# Patient Record
Sex: FEMALE | Race: OTHER
[De-identification: names, ages, dates, MRNs, and addresses within clinical notes are randomized per-mention and may not be internally consistent; named-entity substitution may affect disease eponyms.]

---

## 2020-08-10 ENCOUNTER — APPOINTMENT (OUTPATIENT)
Dept: GASTROENTEROLOGY | Facility: CLINIC | Age: 55
End: 2020-08-10
Payer: COMMERCIAL

## 2020-08-10 VITALS
HEIGHT: 64 IN | SYSTOLIC BLOOD PRESSURE: 122 MMHG | TEMPERATURE: 98.6 F | BODY MASS INDEX: 27.31 KG/M2 | DIASTOLIC BLOOD PRESSURE: 80 MMHG | WEIGHT: 160 LBS | RESPIRATION RATE: 15 BRPM | HEART RATE: 88 BPM | OXYGEN SATURATION: 96 %

## 2020-08-10 DIAGNOSIS — R93.89 ABNORMAL FINDINGS ON DIAGNOSTIC IMAGING OF OTHER SPECIFIED BODY STRUCTURES: ICD-10-CM

## 2020-08-10 PROCEDURE — 99204 OFFICE O/P NEW MOD 45 MIN: CPT

## 2020-08-10 NOTE — PHYSICAL EXAM
[General Appearance - Alert] : alert [General Appearance - Well Nourished] : well nourished [General Appearance - In No Acute Distress] : in no acute distress [General Appearance - Well Developed] : well developed [Sclera] : the sclera and conjunctiva were normal [General Appearance - Well-Appearing] : healthy appearing [PERRL With Normal Accommodation] : pupils were equal in size, round, and reactive to light [Extraocular Movements] : extraocular movements were intact [Outer Ear] : the ears and nose were normal in appearance [Examination Of The Oral Cavity] : the lips and gums were normal [Both Tympanic Membranes Were Examined] : both tympanic membranes were normal [Neck Appearance] : the appearance of the neck was normal [Neck Cervical Mass (___cm)] : no neck mass was observed [Respiration, Rhythm And Depth] : normal respiratory rhythm and effort [Exaggerated Use Of Accessory Muscles For Inspiration] : no accessory muscle use [Heart Rate And Rhythm] : heart rate was normal and rhythm regular [Murmurs] : no murmurs [Heart Sounds] : normal S1 and S2 [Edema] : there was no peripheral edema [Abdomen Soft] : soft [Bowel Sounds] : normal bowel sounds [Abdomen Tenderness] : non-tender [Abdomen Mass (___ Cm)] : no abdominal mass palpated [No CVA Tenderness] : no ~M costovertebral angle tenderness [Abnormal Walk] : normal gait [Nail Clubbing] : no clubbing  or cyanosis of the fingernails [Musculoskeletal - Swelling] : no joint swelling seen [Skin Color & Pigmentation] : normal skin color and pigmentation [Skin Turgor] : normal skin turgor [] : no rash [No Focal Deficits] : no focal deficits [Oriented To Time, Place, And Person] : oriented to person, place, and time [Impaired Insight] : insight and judgment were intact [Affect] : the affect was normal [Memory Recent] : recent memory was not impaired [Mood] : the mood was normal

## 2020-08-10 NOTE — HISTORY OF PRESENT ILLNESS
[Nausea] : denies nausea [Heartburn] : stable heartburn [Vomiting] : denies vomiting [Constipation] : improvement in constipation [Diarrhea] : stable diarrhea [Yellow Skin Or Eyes (Jaundice)] : denies jaundice [Abdominal Swelling] : denies abdominal swelling [Abdominal Pain] : denies abdominal pain [Rectal Pain] : denies rectal pain [GERD] : gastroesophageal reflux disease [Peptic Ulcer Disease] : peptic ulcer disease [Cholelithiasis] : cholelithiasis [Diverticulitis] : diverticulitis [Cholecystectomy] : cholecystectomy [Appendectomy] : appendectomy [Abdominal Surgery] : abdominal surgery [Hiatus Hernia] : no hiatus hernia [Pancreatitis] : no pancreatitis [Kidney Stone] : no kidney stone [Inflammatory Bowel Disease] : no inflammatory bowel disease [Irritable Bowel Syndrome] : no irritable bowel syndrome [Alcohol Abuse] : no alcohol abuse [Malignancy] : no malignancy [de-identified] : 55 yo F PMH back pain, appendectomy, cholecystectomy, HLD who presents for evaluation of abnormal LFTs and abnormal liver ultrasound. Additionally patient with episodes of diverticulitis and new onset of rectal bleeding. Patient also reports having heartburn and has a history of H pylori s/p treatment, and abnormal EGD. \par \par Patient reports she was referred here today for an abnormal liver ultrasound. Review of the ultrasound shows increased echotecture diffusely, possibly from steatosis or chronic parenchymal disease. Also noted increased diameter of CBD. She notes that ultrasound was done because of abnormal LFTs with AST 51/ALT 77 (normal AP, bilis). Since then she was started on a statin for elevated cholesterol and had a repeat set of labs that revealed AST 23/ALT 30. Given her family history of mom with metastatic cancer (unclear source was stomach, colon, cholangio) PMD referred to GI for further evaluation. She is also very nervous because her sister was diagnosed with breast cancer that is now metastatic to bone. Patient denies any jaundice, scleral icterus, confusion, kate colored stools (sometimes yellow). \par \par Complains of heartburn 2-3x/week and takes OTC pepcid etc for these symptoms with improvement. She does note that she has a histroy of H pylori that was treated with 2-3 weeks of antibiotic course. She did have an H pylori breath test in march 2016 that was negative for H pylori. Did have an EGD with Dr. Mane in 2016 to r/o gastric outlet obstruction, but per patient there was an abnormal area that he biopsied given her family history and history of H pylori and per patient results were unremarkable. No EGD report available for review today. \par \par She also complains of issues with diverticulitis. Since she started having issues with diverticulitis 4-5 yrs ago she notes she has had 3 episodes. They do not get severe enough where she has to seek care at the hospital. She denies fevers. She reports the most recent episode was 2 weeks ago. She took a laxative, went to the bathroom, and reports that though painful at the time, it resolved in 2-3 days. She now feels pain free. Pain is always localized to llq\par \par She previously had more issues with constipation which seem to be improved overall now that she is drinking more water, taking probiotics. If she does not take these will be constipated overall. \par \par She notes some intermittent episodes of blood in the stool. Has happened 4x this year. She does not think it correlates with straining or being constipated. She thinks this is a new symptom. She did not have it when she had her last colonoscopy. No mention of hemorrhoids on the prior colonoscopy. She notes that blood seems to be mixed in with the stool and not really when she wipes or just in the toilet. \par \par She also has been having some diarrhea, which is relatively new for her as well. She thinks this is correlated to stress. Constipation has been improved overall, but 2x/week will have watery diarrhea. Does not think she has these episodes after being severely constipated. \par \par Prior procedures\par Colonoscopy: entire examined colon is normal, mild diverticulosis in the sigmoid colon. No e/o bleeding\par \par Prior imaging\par Ulatround 7/25/20: Although CBD is within normal limits for the postcholecystectomy state at 9mm, it is larger in caliber than prior exam when it measured 4mm and the patient was status post cholecystectomy at that time too. Although no choledocholithiasis is seen, the mid to distal CBD was obscured by bowel gas. Further evaluation with MRCP or ERCP is advised. \par \par The liver is diffusely increased in echotexture, either on the basis of hepatic steatosis or chronic parenchymal disease though new from prior\par \par PMH\par HLD\par \par \par PSH\par Appendectomy\par Cholecystectomy\par Ankle surgery\par \par MEDS: \par hydrocodone-acetaminophen 5-325, taken PRN severe back pain\par statin\par vitamin D supplementation\par \par SH: \par stopped EtOH 3 weeks ago (previously drinking 1 beer/vodka and orange juice daily). Reports it was never excessive (but we discussed that for women 7 drinks per week is the maxiumum recommended amount, furthermore given abnormal LFTs, would recommend cessation) \par Prior smoker, quit a long time ago\par Denies illicit drug use\par \par FH: \par Mother with metastatic gastric vs cholangiocarcinoma. Unclear if also could have been CRC\par Sister recently diagnosed/currently fighting breast cancer metastatic to bone \par Father with stomach issues: reports her dad had over 20 surgeries\par

## 2020-08-10 NOTE — ASSESSMENT
[FreeTextEntry1] : 53 yo F PMH back pain, appendectomy, cholecystectomy, HLD who presents after referred for for evaluation of abnormal LFTs and abnormal liver ultrasound. Additionally patient with episodes of diverticulitis and new onset of rectal bleeding. Patient also reports having heartburn and has a history of H pylori s/p treatment, and abnormal EGD. \par \par Abnormal ultrasound of liver, abnormal LFTs (mild transamintis which has improved since starting a statin): liver with increased echogenicity on ultrasound consistent with likely hepatic steatosis as well as increased diameter of CBD from 4mm to 9mm (previously underwent ultrasound when CBD was 4mm, which was post-cholecystectomy. Now diameter has increased to 9mm). This could be normal with aging, but is a bit more than would be expected. \par - given possible family history of cholangio, will plan for MRI liver and MRCP to evaluate the abnormal echogenicity as well as the enlarged CBD\par - repeat LFTs planned for end August (however AST 50s/ALT 70s) now improved to normal AST/ALT 30 after starting statin\par - continue statin\par - discussed continued EtOH cessation\par - discussed importance of weight loss to prevent steatosis (patient has been eating less pasta, ice cream, breads)\par \par #Heartburn, abnormal EGD per patient, history of H pylori: unclear if patient with possible PUD, gastropathy, gastritis, esophagitis\par - given family history of mom with metastatic gastric vs gallbladder ca, plan for EGD to evaluate ongoing/worsening heartburn as well as history of abnormal finding on EGD. Patient is also asking to have "check up done" because she is nervous (given mom's history and sister recently diagnosed with breast cancer metastatic to bone)\par - will obtain report of prior EGD\par - patient had labs 2-3 wks ago, will be getting labs with PMD end of August (asked her to check and make sure getting BMP, CBC and repeat LFTs) at that lab draw. Will hold off doing pre-procedure labs in the office today, given she will be getting labs soon. Patient expresses understanding that labs are needed within 30 days of the procedure\par \par Rectal bleeding - has had 4 episodes of rectal bleeding in the past year. She reports this is a new symptoms (and was not present when she underwent the colonoscopy in 2016). Has continued to have intermittent episodes of diverticulitis with pain in the LLQ\par - plan for colonoscopy (given family history of mother's metastatic cancer - thought to be gastric or gallbladder in origin, but could have been colon?) and new symptom of rectal bleeding, will repeat colonoscopy\par \par Diverticulitis \par - discussed the alarm symptoms that patient should seek medical care. Generally symptoms self resolve in 2-3 days. patient has not had fevers, severe unrelenting pain. \par - may need to consider empiric abx prescription if continues to have episodes (to be taken during episodes)\par - prior colonoscopy with diverticulosis noted\par \par Constipation: is on narcotics PRN for severe back pain. We discussed this can make constipation worse.\par - continue water and probiotics, which helps keep constipation controlled\par - if any worsening of symptoms, can consider laxatives (i.e. miralax) and/or fiber (benefiber) supplement\par \par Diarrhea - episodes twice a week. Suspect component of overflow incontinence, however patient reports constipation has improved and has been having regular BMs. Will occasionally have diarrhea (usually when stressed)\par - plan for colonoscopy (will check random biopsies to r/o microscopic colitis)\par - can consider some stool studies at time of colonoscopy\par \par Follow up post procedure

## 2020-08-19 LAB
ALBUMIN SERPL ELPH-MCNC: 5 G/DL
ALP BLD-CCNC: 73 U/L
ALT SERPL-CCNC: 30 U/L
ANION GAP SERPL CALC-SCNC: 15 MMOL/L
AST SERPL-CCNC: 19 U/L
BASOPHILS # BLD AUTO: 0.02 K/UL
BASOPHILS NFR BLD AUTO: 0.3 %
BILIRUB SERPL-MCNC: 0.4 MG/DL
BUN SERPL-MCNC: 11 MG/DL
CALCIUM SERPL-MCNC: 10.3 MG/DL
CHLORIDE SERPL-SCNC: 100 MMOL/L
CO2 SERPL-SCNC: 26 MMOL/L
CREAT SERPL-MCNC: 0.62 MG/DL
EOSINOPHIL # BLD AUTO: 0.36 K/UL
EOSINOPHIL NFR BLD AUTO: 5.5 %
GLUCOSE SERPL-MCNC: 96 MG/DL
HCT VFR BLD CALC: 48.3 %
HGB BLD-MCNC: 15.5 G/DL
IMM GRANULOCYTES NFR BLD AUTO: 0.2 %
LYMPHOCYTES # BLD AUTO: 2.49 K/UL
LYMPHOCYTES NFR BLD AUTO: 38 %
MAN DIFF?: NORMAL
MCHC RBC-ENTMCNC: 28.8 PG
MCHC RBC-ENTMCNC: 32.1 GM/DL
MCV RBC AUTO: 89.6 FL
MONOCYTES # BLD AUTO: 0.56 K/UL
MONOCYTES NFR BLD AUTO: 8.5 %
NEUTROPHILS # BLD AUTO: 3.12 K/UL
NEUTROPHILS NFR BLD AUTO: 47.5 %
PLATELET # BLD AUTO: 311 K/UL
POTASSIUM SERPL-SCNC: 4.9 MMOL/L
PROT SERPL-MCNC: 7.6 G/DL
RBC # BLD: 5.39 M/UL
RBC # FLD: 13.1 %
SODIUM SERPL-SCNC: 140 MMOL/L
WBC # FLD AUTO: 6.56 K/UL

## 2020-09-01 ENCOUNTER — NON-APPOINTMENT (OUTPATIENT)
Age: 55
End: 2020-09-01

## 2020-09-02 ENCOUNTER — OUTPATIENT (OUTPATIENT)
Dept: OUTPATIENT SERVICES | Facility: HOSPITAL | Age: 55
LOS: 1 days | Discharge: HOME | End: 2020-09-02
Payer: COMMERCIAL

## 2020-09-02 ENCOUNTER — APPOINTMENT (OUTPATIENT)
Dept: GASTROENTEROLOGY | Facility: HOSPITAL | Age: 55
End: 2020-09-02

## 2020-09-02 ENCOUNTER — RESULT REVIEW (OUTPATIENT)
Age: 55
End: 2020-09-02

## 2020-09-02 PROCEDURE — 88305 TISSUE EXAM BY PATHOLOGIST: CPT

## 2020-09-02 PROCEDURE — 45378 DIAGNOSTIC COLONOSCOPY: CPT

## 2020-09-02 PROCEDURE — 43235 EGD DIAGNOSTIC BRUSH WASH: CPT

## 2020-09-02 PROCEDURE — 88305 TISSUE EXAM BY PATHOLOGIST: CPT | Mod: 26

## 2020-09-02 PROCEDURE — 43239 EGD BIOPSY SINGLE/MULTIPLE: CPT

## 2020-09-03 LAB — SURGICAL PATHOLOGY STUDY: SIGNIFICANT CHANGE UP

## 2020-09-06 DIAGNOSIS — K62.5 HEMORRHAGE OF ANUS AND RECTUM: ICD-10-CM

## 2020-09-06 DIAGNOSIS — K21.9 GASTRO-ESOPHAGEAL REFLUX DISEASE WITHOUT ESOPHAGITIS: ICD-10-CM

## 2020-09-06 DIAGNOSIS — K64.0 FIRST DEGREE HEMORRHOIDS: ICD-10-CM

## 2020-09-06 DIAGNOSIS — Z90.49 ACQUIRED ABSENCE OF OTHER SPECIFIED PARTS OF DIGESTIVE TRACT: ICD-10-CM

## 2020-09-06 DIAGNOSIS — R12 HEARTBURN: ICD-10-CM

## 2020-09-06 DIAGNOSIS — Z87.891 PERSONAL HISTORY OF NICOTINE DEPENDENCE: ICD-10-CM

## 2020-09-06 DIAGNOSIS — K57.92 DIVERTICULITIS OF INTESTINE, PART UNSPECIFIED, WITHOUT PERFORATION OR ABSCESS WITHOUT BLEEDING: ICD-10-CM

## 2020-09-06 DIAGNOSIS — K64.4 RESIDUAL HEMORRHOIDAL SKIN TAGS: ICD-10-CM

## 2020-09-09 ENCOUNTER — APPOINTMENT (OUTPATIENT)
Dept: GASTROENTEROLOGY | Facility: CLINIC | Age: 55
End: 2020-09-09

## 2020-12-04 ENCOUNTER — APPOINTMENT (OUTPATIENT)
Dept: GASTROENTEROLOGY | Facility: CLINIC | Age: 55
End: 2020-12-04
Payer: COMMERCIAL

## 2020-12-04 VITALS
DIASTOLIC BLOOD PRESSURE: 90 MMHG | BODY MASS INDEX: 27.83 KG/M2 | HEIGHT: 64 IN | RESPIRATION RATE: 15 BRPM | HEART RATE: 92 BPM | TEMPERATURE: 98 F | SYSTOLIC BLOOD PRESSURE: 135 MMHG | WEIGHT: 163 LBS | OXYGEN SATURATION: 98 %

## 2020-12-04 DIAGNOSIS — R12 HEARTBURN: ICD-10-CM

## 2020-12-04 PROCEDURE — 99072 ADDL SUPL MATRL&STAF TM PHE: CPT

## 2020-12-04 PROCEDURE — 99214 OFFICE O/P EST MOD 30 MIN: CPT

## 2020-12-04 NOTE — PHYSICAL EXAM
[General Appearance - Alert] : alert [General Appearance - In No Acute Distress] : in no acute distress [General Appearance - Well Nourished] : well nourished [General Appearance - Well Developed] : well developed [General Appearance - Well-Appearing] : healthy appearing [Sclera] : the sclera and conjunctiva were normal [PERRL With Normal Accommodation] : pupils were equal in size, round, and reactive to light [Extraocular Movements] : extraocular movements were intact [Outer Ear] : the ears and nose were normal in appearance [Examination Of The Oral Cavity] : the lips and gums were normal [Both Tympanic Membranes Were Examined] : both tympanic membranes were normal [Neck Appearance] : the appearance of the neck was normal [Neck Cervical Mass (___cm)] : no neck mass was observed [Respiration, Rhythm And Depth] : normal respiratory rhythm and effort [Exaggerated Use Of Accessory Muscles For Inspiration] : no accessory muscle use [Heart Rate And Rhythm] : heart rate was normal and rhythm regular [Heart Sounds] : normal S1 and S2 [Murmurs] : no murmurs [Edema] : there was no peripheral edema [Bowel Sounds] : normal bowel sounds [Abdomen Soft] : soft [Abdomen Tenderness] : non-tender [Abdomen Mass (___ Cm)] : no abdominal mass palpated [No CVA Tenderness] : no ~M costovertebral angle tenderness [Abnormal Walk] : normal gait [Nail Clubbing] : no clubbing  or cyanosis of the fingernails [Musculoskeletal - Swelling] : no joint swelling seen [Skin Color & Pigmentation] : normal skin color and pigmentation [Skin Turgor] : normal skin turgor [] : no rash [No Focal Deficits] : no focal deficits [Oriented To Time, Place, And Person] : oriented to person, place, and time [Impaired Insight] : insight and judgment were intact [Affect] : the affect was normal [Mood] : the mood was normal [Memory Recent] : recent memory was not impaired

## 2020-12-06 NOTE — HISTORY OF PRESENT ILLNESS
[Heartburn] : stable heartburn [Nausea] : denies nausea [Vomiting] : denies vomiting [Diarrhea] : stable diarrhea [Constipation] : improvement in constipation [Yellow Skin Or Eyes (Jaundice)] : denies jaundice [Abdominal Pain] : denies abdominal pain [Abdominal Swelling] : denies abdominal swelling [Rectal Pain] : denies rectal pain [GERD] : gastroesophageal reflux disease [Peptic Ulcer Disease] : peptic ulcer disease [Cholelithiasis] : cholelithiasis [Diverticulitis] : diverticulitis [Abdominal Surgery] : abdominal surgery [Appendectomy] : appendectomy [Cholecystectomy] : cholecystectomy [Hiatus Hernia] : no hiatus hernia [Pancreatitis] : no pancreatitis [Kidney Stone] : no kidney stone [Inflammatory Bowel Disease] : no inflammatory bowel disease [Irritable Bowel Syndrome] : no irritable bowel syndrome [Alcohol Abuse] : no alcohol abuse [Malignancy] : no malignancy [de-identified] : 55 yo F PMH back pain, appendectomy, cholecystectomy, HLD who presents for follow up visit after initially presenting for evaluation of abnormal LFTs and abnormal liver ultrasound. \par \par Since the last visit we repeated labs and LFTs normal. Also underwent MRI (given family history of cancer) to evaluate the enlarged CBD (though she does have the history of a cholecystectomy) which was normal. She underwent an EGD and Colonoscopy as well which showed some gastropathy on the EGD and tics in sigmoid colon and small internal hemorrhoids on the colonoscopy. Pathology from stomach biopsies showed no HP. \par \par MRI: Oct 19, 2020\par s/p cholecystectomy\par Upper limits of normal size CBD without obstructing lesion\par Small bilateral renal cysts. No follow-up is needed for the cysts\par \par Patient presents for follow up visit. She continues to have some acid reflux and is taking 20mg famotidine almost daily. She notes that a lot of her GI issues seem to be linked to life stressors. Her sister was diagnosed with stage 4 cancer, mets to bone and poor prognosis. With COVID as well limiting her visitation, this has been an extremely challenging time. \par \par She has sensation where her stomach tightens suddenly and she throws up. She does not have any issues with nausea. 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 She has started therapy as well yesterday\par \par -----------------\par PRIOR HPI\par 55 yo F PMH back pain, appendectomy, cholecystectomy, HLD who presents for evaluation of abnormal LFTs and abnormal liver ultrasound. Additionally patient with episodes of diverticulitis and new onset of rectal bleeding. Patient also reports having heartburn and has a history of H pylori s/p treatment, and abnormal EGD. \par \par Patient reports she was referred here today for an abnormal liver ultrasound. Review of the ultrasound shows increased echotecture diffusely, possibly from steatosis or chronic parenchymal disease. Also noted increased diameter of CBD. She notes that ultrasound was done because of abnormal LFTs with AST 51/ALT 77 (normal AP, bilis). Since then she was started on a statin for elevated cholesterol and had a repeat set of labs that revealed AST 23/ALT 30. Given her family history of mom with metastatic cancer (unclear source was stomach, colon, cholangio) PMD referred to GI for further evaluation. She is also very nervous because her sister was diagnosed with breast cancer that is now metastatic to bone. Patient denies any jaundice, scleral icterus, confusion, kate colored stools (sometimes yellow). \par \par Complains of heartburn 2-3x/week and takes OTC pepcid etc for these symptoms with improvement. She does note that she has a histroy of H pylori that was treated with 2-3 weeks of antibiotic course. She did have an H pylori breath test in march 2016 that was negative for H pylori. Did have an EGD with Dr. Mane in 2016 to r/o gastric outlet obstruction, but per patient there was an abnormal area that he biopsied given her family history and history of H pylori and per patient results were unremarkable. No EGD report available for review today. \par \par She also complains of issues with diverticulitis. Since she started having issues with diverticulitis 4-5 yrs ago she notes she has had 3 episodes. They do not get severe enough where she has to seek care at the hospital. She denies fevers. She reports the most recent episode was 2 weeks ago. She took a laxative, went to the bathroom, and reports that though painful at the time, it resolved in 2-3 days. She now feels pain free. Pain is always localized to llq\par \par She previously had more issues with constipation which seem to be improved overall now that she is drinking more water, taking probiotics. If she does not take these will be constipated overall. \par \par She notes some intermittent episodes of blood in the stool. Has happened 4x this year. She does not think it correlates with straining or being constipated. She thinks this is a new symptom. She did not have it when she had her last colonoscopy. No mention of hemorrhoids on the prior colonoscopy. She notes that blood seems to be mixed in with the stool and not really when she wipes or just in the toilet. \par \par She also has been having some diarrhea, which is relatively new for her as well. She thinks this is correlated to stress. Constipation has been improved overall, but 2x/week will have watery diarrhea. Does not think she has these episodes after being severely constipated. \par \par Prior procedures\par Colonoscopy: entire examined colon is normal, mild diverticulosis in the sigmoid colon. No e/o bleeding\par \par Prior imaging\par Ulatround 7/25/20: Although CBD is within normal limits for the postcholecystectomy state at 9mm, it is larger in caliber than prior exam when it measured 4mm and the patient was status post cholecystectomy at that time too. Although no choledocholithiasis is seen, the mid to distal CBD was obscured by bowel gas. Further evaluation with MRCP or ERCP is advised. \par \par The liver is diffusely increased in echotexture, either on the basis of hepatic steatosis or chronic parenchymal disease though new from prior\par \par PMH\par HLD\par \par \par PSH\par Appendectomy\par Cholecystectomy\par Ankle surgery\par \par MEDS: \par hydrocodone-acetaminophen 5-325, taken PRN severe back pain\par statin\par vitamin D supplementation\par \par SH: \par stopped EtOH 3 weeks ago (previously drinking 1 beer/vodka and orange juice daily). Reports it was never excessive (but we discussed that for women 7 drinks per week is the maxiumum recommended amount, furthermore given abnormal LFTs, would recommend cessation) \par Prior smoker, quit a long time ago\par Denies illicit drug use\par \par FH: \par Mother with metastatic gastric vs cholangiocarcinoma. Unclear if also could have been CRC\par Sister recently diagnosed/currently fighting breast cancer metastatic to bone \par Father with stomach issues: reports her dad had over 20 surgeries\par

## 2020-12-06 NOTE — ASSESSMENT
[FreeTextEntry1] : 55 yo F PMH back pain, appendectomy, cholecystectomy, HLD who presents for follow up. She was initially referred for for evaluation of abnormal LFTs and abnormal liver ultrasound. Subsequent LFTs normal, MRI liver without concerning findings. Additionally patient with episodes of diverticulitis and new onset of rectal bleeding. Colonoscopy notable for diverticulosis and hemorrhoids, but no other source of bleeding identified. Patient also reports having heartburn and has a history of H pylori s/p treatment, and abnormal EGD.Recent EGD unremarkable and negative for HP. \par \par Abnormal ultrasound of liver, abnormal LFTs (mild transamintis which has improved since starting a statin): liver with increased echogenicity on ultrasound consistent with likely hepatic steatosis as well as increased diameter of CBD from 4mm to 9mm (previously underwent ultrasound when CBD was 4mm, which was post-cholecystectomy. Now diameter has increased to 9mm). This could be normal with aging\par - liver MRI unremarkable (given possible family history of cholangio, did MRI liver and MRCP to evaluate the abnormal echogenicity as well as the enlarged CBD)\par - repeat LFT normal\par - continue statin\par - discussed continued EtOH cessation\par - discussed importance of weight loss to prevent steatosis (patient has been eating less pasta, ice cream, breads)\par \par #Heartburn, abnormal EGD per patient, history of H pylori: unclear if patient with possible PUD, gastropathy, gastritis, esophagitis\par -PPI and can continue famotidine for breakthrough symptoms\par - can consider bravo testing in the future\par - given family history of mom with metastatic gastric vs gallbladder ca, did EGD and was unremarkable. Patient was also asking to have "check up done" because she is nervous (given mom's history and sister recently diagnosed with breast cancer metastatic to bone)\par \par Rectal bleeding - has had 4 episodes of rectal bleeding in the past year. She reports this is a new symptoms (and was not present when she underwent the colonoscopy in 2016). Has continued to have intermittent episodes of diverticulitis with pain in the LLQ. Recent colonoscopy with diverticulosis and hemorrhoids, no other souce of rectal bleeding identified\par - rectal bleeding has improved\par - continue to avoid constipation\par \par Diverticulitis episodes\par - discussed the alarm symptoms that patient should seek medical care. Generally symptoms self resolve in 2-3 days. patient has not had fevers, severe unrelenting pain. \par - may need to consider empiric abx prescription if continues to have episodes (to be taken during episodes)\par \par Constipation: is on narcotics PRN for severe back pain. We discussed this can make constipation worse.\par - continue water and probiotics, which helps keep constipation controlled\par - if any worsening of symptoms, can consider laxatives (i.e. miralax) and/or fiber (benefiber) supplement\par \par Diarrhea - improved now\par \par Life stressors\par - patient has started therapy sessions, continue\par \par Follow up 4 months or as needed

## 2021-03-01 ENCOUNTER — RX RENEWAL (OUTPATIENT)
Age: 56
End: 2021-03-01

## 2021-04-29 ENCOUNTER — RX RENEWAL (OUTPATIENT)
Age: 56
End: 2021-04-29

## 2021-04-29 RX ORDER — OMEPRAZOLE 40 MG/1
40 CAPSULE, DELAYED RELEASE ORAL DAILY
Qty: 30 | Refills: 1 | Status: ACTIVE | COMMUNITY
Start: 2020-12-04 | End: 1900-01-01

## 2022-01-02 ENCOUNTER — EMERGENCY (EMERGENCY)
Facility: HOSPITAL | Age: 57
LOS: 0 days | Discharge: HOME | End: 2022-01-02
Attending: EMERGENCY MEDICINE | Admitting: EMERGENCY MEDICINE
Payer: COMMERCIAL

## 2022-01-02 VITALS
SYSTOLIC BLOOD PRESSURE: 153 MMHG | OXYGEN SATURATION: 100 % | RESPIRATION RATE: 18 BRPM | HEART RATE: 105 BPM | TEMPERATURE: 98 F | DIASTOLIC BLOOD PRESSURE: 81 MMHG

## 2022-01-02 VITALS
OXYGEN SATURATION: 100 % | TEMPERATURE: 99 F | SYSTOLIC BLOOD PRESSURE: 123 MMHG | DIASTOLIC BLOOD PRESSURE: 75 MMHG | HEART RATE: 90 BPM | RESPIRATION RATE: 18 BRPM

## 2022-01-02 DIAGNOSIS — J02.9 ACUTE PHARYNGITIS, UNSPECIFIED: ICD-10-CM

## 2022-01-02 DIAGNOSIS — R50.9 FEVER, UNSPECIFIED: ICD-10-CM

## 2022-01-02 DIAGNOSIS — R05.9 COUGH, UNSPECIFIED: ICD-10-CM

## 2022-01-02 DIAGNOSIS — U07.1 COVID-19: ICD-10-CM

## 2022-01-02 PROCEDURE — 99284 EMERGENCY DEPT VISIT MOD MDM: CPT

## 2022-01-02 NOTE — ED PROVIDER NOTE - CLINICAL SUMMARY MEDICAL DECISION MAKING FREE TEXT BOX
a/p; COVID +,sat 100%, supportive care advised, f/u pmd 1-2 weeks, strict return precautions provided.

## 2022-01-02 NOTE — ED ADULT NURSE NOTE - OBJECTIVE STATEMENT
pt alert and oriented x4 complaining of fevers, and cough. pt not in any acute respiratory distress at this time

## 2022-01-02 NOTE — ED PROVIDER NOTE - OBJECTIVE STATEMENT
56 y.o. female no pmh , received covi19 vxand booster, here for anosmia, cough fever chills, sore thraot after positive exposure to covid. Here for eval.

## 2022-01-02 NOTE — ED PROVIDER NOTE - NSFOLLOWUPINSTRUCTIONS_ED_ALL_ED_FT
MAKE AN APPOINTMENT WITH [facility]. MONITOR YOUR PULSE 0OX AT HOME. TAKE TYLENOL AND MOTRIN.      You have tested POSITIVE for the novel coronavirus (COVID-19). Upon discharge, you must self-quarantine for 14 days, or until the Department of Health contacts you. Please wear a face mask if you are around other individuals. Try to avoid contact with house members, family, and friends for the duration of this quarantine. Please follow up with your primary care physician within 2-3 weeks of your discharge from [facility]. Please take all medications as prescribed. If you experience any worsening or recurrence of your symptoms, particularly worsening or high fever, shortness of breathe, extreme fatigue, or bloody cough please call 9-1-1 immediately or report to the nearest Emergency Department. If you have any questions or concerns, please do not hesitate to call the hospital at [phone].

## 2022-01-02 NOTE — ED PROVIDER NOTE - NSFOLLOWUPCLINICS_GEN_ALL_ED_FT
[facility] [place] [address]  Phone: [phone]  Fax:

## 2022-01-02 NOTE — ED PROVIDER NOTE - PHYSICAL EXAMINATION
Physical Exam    Vital Signs: I have reviewed the initial vital signs.  Constitutional: well-nourished, appears stated age, no acute distress  Eyes: Conjunctiva pink, Sclera clear,  Cardiovascular: S1 and S2, regular rate, regular rhythm, well-perfused extremities, radial pulses equal and 2+, calves nonttp, equal in size  Respiratory: unlabored respiratory effort, speaking in full sentences, handling oral secretions, 99% on RA , on nasal cannula, clear to auscultation bilaterally no wheezing, rales and rhonchi  Gastrointestinal: soft, non-tender abdomen  Musculoskeletal: supple neck, no lower extremity edemamoving all extreitiesm, dry, no rashes, lacerations,  Neurologic: awake, alert

## 2022-01-02 NOTE — ED PROVIDER NOTE - PATIENT PORTAL LINK FT
You can access the FollowMyHealth Patient Portal offered by [facility] by registering at the following website: [email or web address]. By joining [employer]’s FollowMyHealth portal, you will also be able to view your health information using other applications (apps) compatible with our system.

## 2022-09-18 ENCOUNTER — EMERGENCY (EMERGENCY)
Facility: HOSPITAL | Age: 57
LOS: 0 days | Discharge: HOME | End: 2022-09-18
Attending: EMERGENCY MEDICINE | Admitting: STUDENT IN AN ORGANIZED HEALTH CARE EDUCATION/TRAINING PROGRAM

## 2022-09-18 VITALS
TEMPERATURE: 99 F | RESPIRATION RATE: 20 BRPM | SYSTOLIC BLOOD PRESSURE: 124 MMHG | OXYGEN SATURATION: 98 % | HEART RATE: 78 BPM | DIASTOLIC BLOOD PRESSURE: 56 MMHG

## 2022-09-18 VITALS
HEART RATE: 99 BPM | WEIGHT: 179.9 LBS | TEMPERATURE: 99 F | OXYGEN SATURATION: 98 % | SYSTOLIC BLOOD PRESSURE: 142 MMHG | RESPIRATION RATE: 18 BRPM | DIASTOLIC BLOOD PRESSURE: 75 MMHG

## 2022-09-18 DIAGNOSIS — K57.92 DIVERTICULITIS OF INTESTINE, PART UNSPECIFIED, WITHOUT PERFORATION OR ABSCESS WITHOUT BLEEDING: ICD-10-CM

## 2022-09-18 DIAGNOSIS — R10.32 LEFT LOWER QUADRANT PAIN: ICD-10-CM

## 2022-09-18 LAB
ALBUMIN SERPL ELPH-MCNC: 4.5 G/DL — SIGNIFICANT CHANGE UP (ref 3.5–5.2)
ALP SERPL-CCNC: 75 U/L — SIGNIFICANT CHANGE UP (ref 30–115)
ALT FLD-CCNC: 45 U/L — HIGH (ref 0–41)
ANION GAP SERPL CALC-SCNC: 12 MMOL/L — SIGNIFICANT CHANGE UP (ref 7–14)
AST SERPL-CCNC: 25 U/L — SIGNIFICANT CHANGE UP (ref 0–41)
BASOPHILS # BLD AUTO: 0.04 K/UL — SIGNIFICANT CHANGE UP (ref 0–0.2)
BASOPHILS NFR BLD AUTO: 0.4 % — SIGNIFICANT CHANGE UP (ref 0–1)
BILIRUB SERPL-MCNC: 0.7 MG/DL — SIGNIFICANT CHANGE UP (ref 0.2–1.2)
BUN SERPL-MCNC: 12 MG/DL — SIGNIFICANT CHANGE UP (ref 10–20)
CALCIUM SERPL-MCNC: 8.9 MG/DL — SIGNIFICANT CHANGE UP (ref 8.4–10.5)
CHLORIDE SERPL-SCNC: 97 MMOL/L — LOW (ref 98–110)
CO2 SERPL-SCNC: 24 MMOL/L — SIGNIFICANT CHANGE UP (ref 17–32)
CREAT SERPL-MCNC: 0.6 MG/DL — LOW (ref 0.7–1.5)
EGFR: 105 ML/MIN/1.73M2 — SIGNIFICANT CHANGE UP
EOSINOPHIL # BLD AUTO: 0.41 K/UL — SIGNIFICANT CHANGE UP (ref 0–0.7)
EOSINOPHIL NFR BLD AUTO: 3.9 % — SIGNIFICANT CHANGE UP (ref 0–8)
GLUCOSE SERPL-MCNC: 95 MG/DL — SIGNIFICANT CHANGE UP (ref 70–99)
HCT VFR BLD CALC: 41.2 % — SIGNIFICANT CHANGE UP (ref 37–47)
HGB BLD-MCNC: 14.1 G/DL — SIGNIFICANT CHANGE UP (ref 12–16)
IMM GRANULOCYTES NFR BLD AUTO: 0.5 % — HIGH (ref 0.1–0.3)
LACTATE SERPL-SCNC: 1.1 MMOL/L — SIGNIFICANT CHANGE UP (ref 0.7–2)
LIDOCAIN IGE QN: 22 U/L — SIGNIFICANT CHANGE UP (ref 7–60)
LYMPHOCYTES # BLD AUTO: 1.62 K/UL — SIGNIFICANT CHANGE UP (ref 1.2–3.4)
LYMPHOCYTES # BLD AUTO: 15.4 % — LOW (ref 20.5–51.1)
MCHC RBC-ENTMCNC: 29.1 PG — SIGNIFICANT CHANGE UP (ref 27–31)
MCHC RBC-ENTMCNC: 34.2 G/DL — SIGNIFICANT CHANGE UP (ref 32–37)
MCV RBC AUTO: 84.9 FL — SIGNIFICANT CHANGE UP (ref 81–99)
MONOCYTES # BLD AUTO: 0.99 K/UL — HIGH (ref 0.1–0.6)
MONOCYTES NFR BLD AUTO: 9.4 % — HIGH (ref 1.7–9.3)
NEUTROPHILS # BLD AUTO: 7.4 K/UL — HIGH (ref 1.4–6.5)
NEUTROPHILS NFR BLD AUTO: 70.4 % — SIGNIFICANT CHANGE UP (ref 42.2–75.2)
NRBC # BLD: 0 /100 WBCS — SIGNIFICANT CHANGE UP (ref 0–0)
PLATELET # BLD AUTO: 274 K/UL — SIGNIFICANT CHANGE UP (ref 130–400)
POTASSIUM SERPL-MCNC: 4.1 MMOL/L — SIGNIFICANT CHANGE UP (ref 3.5–5)
POTASSIUM SERPL-SCNC: 4.1 MMOL/L — SIGNIFICANT CHANGE UP (ref 3.5–5)
PROT SERPL-MCNC: 6.9 G/DL — SIGNIFICANT CHANGE UP (ref 6–8)
RBC # BLD: 4.85 M/UL — SIGNIFICANT CHANGE UP (ref 4.2–5.4)
RBC # FLD: 13.7 % — SIGNIFICANT CHANGE UP (ref 11.5–14.5)
SODIUM SERPL-SCNC: 133 MMOL/L — LOW (ref 135–146)
WBC # BLD: 10.51 K/UL — SIGNIFICANT CHANGE UP (ref 4.8–10.8)
WBC # FLD AUTO: 10.51 K/UL — SIGNIFICANT CHANGE UP (ref 4.8–10.8)

## 2022-09-18 PROCEDURE — 74177 CT ABD & PELVIS W/CONTRAST: CPT | Mod: 26,MA

## 2022-09-18 PROCEDURE — 71045 X-RAY EXAM CHEST 1 VIEW: CPT | Mod: 26

## 2022-09-18 PROCEDURE — 99285 EMERGENCY DEPT VISIT HI MDM: CPT

## 2022-09-18 RX ORDER — METRONIDAZOLE 500 MG
1 TABLET ORAL
Qty: 30 | Refills: 0
Start: 2022-09-18 | End: 2022-09-27

## 2022-09-18 RX ORDER — MOXIFLOXACIN HYDROCHLORIDE TABLETS, 400 MG 400 MG/1
1 TABLET, FILM COATED ORAL
Qty: 20 | Refills: 0
Start: 2022-09-18 | End: 2022-09-27

## 2022-09-18 RX ORDER — SODIUM CHLORIDE 9 MG/ML
1000 INJECTION, SOLUTION INTRAVENOUS ONCE
Refills: 0 | Status: COMPLETED | OUTPATIENT
Start: 2022-09-18 | End: 2022-09-18

## 2022-09-18 RX ORDER — MORPHINE SULFATE 50 MG/1
4 CAPSULE, EXTENDED RELEASE ORAL ONCE
Refills: 0 | Status: DISCONTINUED | OUTPATIENT
Start: 2022-09-18 | End: 2022-09-18

## 2022-09-18 RX ADMIN — SODIUM CHLORIDE 1000 MILLILITER(S): 9 INJECTION, SOLUTION INTRAVENOUS at 05:38

## 2022-09-18 RX ADMIN — MORPHINE SULFATE 4 MILLIGRAM(S): 50 CAPSULE, EXTENDED RELEASE ORAL at 05:37

## 2022-09-18 NOTE — ED PROVIDER NOTE - PHYSICAL EXAMINATION
CONSTITUTIONAL: Well-developed; well-nourished; in no acute distress.   SKIN: warm, dry  HEAD: Normocephalic; atraumatic.  EYES: PERRL, EOMI, no conjunctival erythema  ENT: No nasal discharge; airway clear.  NECK: Supple; non tender.  CARD: S1, S2 normal; no murmurs, gallops, or rubs. Regular rate and rhythm.   RESP: No wheezes, rales or rhonchi.  ABD: soft + tender in left lower quadrant  EXT: Normal ROM.  No clubbing, cyanosis or edema.   LYMPH: No acute cervical adenopathy.  NEURO: Alert, oriented, grossly unremarkable  PSYCH: Cooperative, appropriate.

## 2022-09-18 NOTE — ED PROVIDER NOTE - OBJECTIVE STATEMENT
57-year-old female past medical history of diverticulitis presents today with 1 day of left lower quadrant pain sudden onset feels similar to previous episodes of diverticulitis sharp nonradiating no modifying factors no fevers chills no nausea vomiting diarrhea

## 2022-09-18 NOTE — ED PROVIDER NOTE - PATIENT PORTAL LINK FT
You can access the FollowMyHealth Patient Portal offered by [facility] by registering at the following website: [email or web address]. By joining [employer]’s FollowMyHealth portal, you will also be able to view your health information using other applications (apps) compatible with our system.

## 2022-09-18 NOTE — ED PROVIDER NOTE - CARE PROVIDER_API CALL
Natividad Stark  GASTROENTEROLOGY  [address] [place] [address]  Phone: [phone]  Fax: [phone]  Follow Up Time: 7-10 Days

## 2022-09-18 NOTE — ED PROVIDER NOTE - CLINICAL SUMMARY MEDICAL DECISION MAKING FREE TEXT BOX
pt signed to me by Dr. Almazan. pt here with llq abd pain, found to have diverticulitis. pt advised GI follow up. will need outpt colonoscopy pt signed to me by Dr. Almazan. pt here with llq abd pain, found to have diverticulitis. pt advised GI follow up. will need outpt colonoscopy. Pt feeling improved, tolerating PO, abdomen soft, non-tender, non-distended, no rebound, no guarding.  Patient be discharged with antibiotics for diverticulitis

## 2022-11-22 ENCOUNTER — APPOINTMENT (OUTPATIENT)
Age: 57
End: 2022-11-22

## 2022-11-22 VITALS
HEART RATE: 88 BPM | OXYGEN SATURATION: 98 % | HEIGHT: 64 IN | TEMPERATURE: 97.9 F | WEIGHT: 166 LBS | RESPIRATION RATE: 15 BRPM | DIASTOLIC BLOOD PRESSURE: 90 MMHG | BODY MASS INDEX: 28.34 KG/M2 | SYSTOLIC BLOOD PRESSURE: 142 MMHG

## 2022-11-22 DIAGNOSIS — K57.32 DIVERTICULITIS OF LARGE INTESTINE W/OUT PERFORATION OR ABSCESS W/OUT BLEEDING: ICD-10-CM

## 2022-11-22 PROCEDURE — 99204 OFFICE O/P NEW MOD 45 MIN: CPT

## 2022-11-22 NOTE — PHYSICAL EXAM
[General Appearance - Alert] : alert [General Appearance - In No Acute Distress] : in no acute distress [General Appearance - Well Nourished] : well nourished [General Appearance - Well Developed] : well developed [General Appearance - Well-Appearing] : healthy appearing [Sclera] : the sclera and conjunctiva were normal [PERRL With Normal Accommodation] : pupils were equal in size, round, and reactive to light [Extraocular Movements] : extraocular movements were intact [Outer Ear] : the ears and nose were normal in appearance [Examination Of The Oral Cavity] : the lips and gums were normal [Both Tympanic Membranes Were Examined] : both tympanic membranes were normal [Neck Appearance] : the appearance of the neck was normal [Neck Cervical Mass (___cm)] : no neck mass was observed [Respiration, Rhythm And Depth] : normal respiratory rhythm and effort [Exaggerated Use Of Accessory Muscles For Inspiration] : no accessory muscle use [Heart Rate And Rhythm] : heart rate was normal and rhythm regular [Heart Sounds] : normal S1 and S2 [Murmurs] : no murmurs [Edema] : there was no peripheral edema [Bowel Sounds] : normal bowel sounds [Abdomen Soft] : soft [Abdomen Tenderness] : non-tender [Abdomen Mass (___ Cm)] : no abdominal mass palpated [Nail Clubbing] : no clubbing  or cyanosis of the fingernails [Musculoskeletal - Swelling] : no joint swelling seen [Skin Color & Pigmentation] : normal skin color and pigmentation [Skin Turgor] : normal skin turgor [] : no rash [No Focal Deficits] : no focal deficits [Impaired Insight] : insight and judgment were intact [Affect] : the affect was normal [Mood] : the mood was normal [Memory Recent] : recent memory was not impaired [Normal] : normal bowel sounds, non-tender, no masses, soft, no no hepato-splenomegaly [No CVA Tenderness] : no CVA  tenderness [Abnormal Walk] : normal gait [Normal Color / Pigmentation] : normal skin color and pigmentation [Cranial Nerves Intact] : cranial nerves 2-12 were intact [Oriented To Time, Place, And Person] : oriented to person, place, and time

## 2022-11-23 NOTE — ASSESSMENT
[FreeTextEntry1] :  GERD\par - Continue famotidine daily \par - EGD was unremarkable\par \par Diverticulitis \par - Reports 3 mild episodes of diverticulitis\par - Will schedule colonoscopy 6 months in 1/2022 at [facility] post diverticulitis attack \par - Colorectal surgery referral as a precautionary measure if needs surgery in the future\par - Encouraged a high fiber diet \par - Tylenol for pain prn\par \par \par

## 2022-11-23 NOTE — REASON FOR VISIT
[Follow-Up: _____] : a [unfilled] follow-up visit [Post ER] : a post ER visit [FreeTextEntry1] : Post Diverticulitis attack at [facility]

## 2022-11-23 NOTE — HISTORY OF PRESENT ILLNESS
[Heartburn] : stable heartburn [Nausea] : denies nausea [Vomiting] : denies vomiting [Diarrhea] : stable diarrhea [Constipation] : improvement in constipation [Yellow Skin Or Eyes (Jaundice)] : denies jaundice [Abdominal Pain] : denies abdominal pain [Abdominal Swelling] : denies abdominal swelling [Rectal Pain] : denies rectal pain [GERD] : gastroesophageal reflux disease [Peptic Ulcer Disease] : peptic ulcer disease [Cholelithiasis] : cholelithiasis [Diverticulitis] : diverticulitis [Abdominal Surgery] : abdominal surgery [Appendectomy] : appendectomy [Cholecystectomy] : cholecystectomy [Hiatus Hernia] : no hiatus hernia [Pancreatitis] : no pancreatitis [Kidney Stone] : no kidney stone [Inflammatory Bowel Disease] : no inflammatory bowel disease [Irritable Bowel Syndrome] : no irritable bowel syndrome [Alcohol Abuse] : no alcohol abuse [Malignancy] : no malignancy [de-identified] : 58 yo F PMH back pain, appendectomy, cholecystectomy, HLD who presents for follow up visit after post [facility] ED on 9/18/22 for diverticulitis. She presented with 1 day of left lower quadrant pain, sudden onset, which felt similar to previous episodes of diverticulitis. Pain was described as sharp nonradiating.  No modifying factors, no fevers chills, no nausea vomiting diarrhea. Currently asymptomatic today.\par \par She continues to have some acid reflux described as a burning sensation in the upper chest. Reports she regurgitates liquid 4x week. She is taking 20mg famotidine almost daily. She notes that a lot of her GI issues seem to be linked to life stressors. She reports her sister was diagnosed with stage 4  breast cancer, mets to bone and liver at age 42 and poor prognosis. She reports she has been eating smaller portions as she has lost her appetite. However, her symptoms remain the same. \par \par CT Scan 9/18/22: IMPRESSION:\par Segment of circumferential colonic wall thickening of the distal \par descending colon with diverticula and adjacent inflammatory stranding, \par compatible with diverticulitis. No evidence of drainable fluid collection \par or pneumoperitoneum. Further evaluation with colonoscopy may be of \par benefit when patient is clinically able.\par \par Past Visit 12/4/20\par Since the last visit we repeated labs and LFTs normal. Also underwent MRI (given family history of cancer) to evaluate the enlarged CBD (though she does have the history of a cholecystectomy) which was normal. She underwent an EGD and Colonoscopy in 2020  which showed some gastropathy on the EGD and tics in sigmoid colon and small internal hemorrhoids on the colonoscopy. Pathology from stomach biopsies showed no HP. \par \par MRI: Oct 19, 2020\par s/p cholecystectomy\par Upper limits of normal size CBD without obstructing lesion\par Small bilateral renal cysts. No follow-up is needed for the cysts\par \par \par FH: \par Mother with metastatic gastric vs cholangiocarcinoma. Unclear if also could have been CRC\par Sister recently diagnosed/currently fighting breast cancer metastatic to bone \par Father with stomach issues: reports her dad had over 20 surgeries\par

## 2023-03-27 ENCOUNTER — NON-APPOINTMENT (OUTPATIENT)
Age: 58
End: 2023-03-27

## 2023-03-30 ENCOUNTER — APPOINTMENT (OUTPATIENT)
Age: 58
End: 2023-03-30
Payer: COMMERCIAL

## 2023-03-30 PROCEDURE — 45378 DIAGNOSTIC COLONOSCOPY: CPT

## 2023-10-18 ENCOUNTER — EMERGENCY (EMERGENCY)
Facility: HOSPITAL | Age: 58
LOS: 0 days | Discharge: LEFT BEFORE DISCHARGE | End: 2023-10-18
Attending: EMERGENCY MEDICINE
Payer: COMMERCIAL

## 2023-10-18 VITALS
DIASTOLIC BLOOD PRESSURE: 92 MMHG | TEMPERATURE: 98 F | RESPIRATION RATE: 19 BRPM | HEART RATE: 87 BPM | OXYGEN SATURATION: 99 % | SYSTOLIC BLOOD PRESSURE: 177 MMHG

## 2023-10-18 DIAGNOSIS — M54.50 LOW BACK PAIN, UNSPECIFIED: ICD-10-CM

## 2023-10-18 DIAGNOSIS — Z87.891 PERSONAL HISTORY OF NICOTINE DEPENDENCE: ICD-10-CM

## 2023-10-18 DIAGNOSIS — Z87.39 PERSONAL HISTORY OF OTHER DISEASES OF THE MUSCULOSKELETAL SYSTEM AND CONNECTIVE TISSUE: ICD-10-CM
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
## 2023-10-18 DIAGNOSIS — Z87.19 PERSONAL HISTORY OF OTHER DISEASES OF THE DIGESTIVE SYSTEM: ICD-10-CM
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
## 2023-10-18 PROCEDURE — 99284 EMERGENCY DEPT VISIT MOD MDM: CPT

## 2023-10-18 PROCEDURE — 99283 EMERGENCY DEPT VISIT LOW MDM: CPT | Mod: 25

## 2023-10-18 PROCEDURE — 96372 THER/PROPH/DIAG INJ SC/IM: CPT

## 2023-10-18 RX ORDER — KETOROLAC TROMETHAMINE 30 MG/ML
30 SYRINGE (ML) INJECTION ONCE
Refills: 0 | Status: DISCONTINUED | OUTPATIENT
Start: 2023-10-18 | End: 2023-10-18

## 2023-10-18 RX ORDER — METHOCARBAMOL 500 MG/1
1500 TABLET, FILM COATED ORAL ONCE
Refills: 0 | Status: COMPLETED | OUTPATIENT
Start: 2023-10-18 | End: 2023-10-18

## 2023-10-18 RX ORDER — ACETAMINOPHEN 500 MG
975 TABLET ORAL ONCE
Refills: 0 | Status: COMPLETED | OUTPATIENT
Start: 2023-10-18 | End: 2023-10-18

## 2023-10-18 RX ADMIN — Medication 975 MILLIGRAM(S): at 19:41

## 2023-10-18 RX ADMIN — METHOCARBAMOL 1500 MILLIGRAM(S): 500 TABLET, FILM COATED ORAL at 19:41

## 2023-10-18 RX ADMIN — Medication 30 MILLIGRAM(S): at 19:41

## 2023-10-18 NOTE — ED PROVIDER NOTE - CLINICAL SUMMARY MEDICAL DECISION MAKING FREE TEXT BOX
58-year-old female with h/o diverticulitis, sciatica following with pain management, in ER with complaint of LBP.  Developed pain when trying to get up off of the floor.  L lower back pain radiating to L buttock and LLE.  Similar to prior episodes of sciatica.  Denies any motor weakness or paresthesias, no saddle anesthesia, no bowel/bladder dysfunction.  No F/C.  No abdominal pain.  No N/V/D.  No CP/SOB.  No HA/dizziness/syncope.  PE -as above  -Patient given pain meds in ER with significant improvement in symptoms.  No concerning red flags on history or physical.  To DC home, patient to follow-up with PMD as well as pain management as outpatient.  Told return to ER if she feels worse, or for any new/concerning symptoms.

## 2023-10-18 NOTE — ED PROVIDER NOTE - OBJECTIVE STATEMENT
58-year-old female with past medical history diverticulitis, herniated nucleus pulposus (s/p discectomy 1999) presents complaint of low back pain.  Patient states she laid on the floor at noon and subsequently when she tried to get up had pain in the left lower back radiating to the left buttock to the posterior lateral aspect and left lower extremity.  Reports this is typical of her sciatica symptoms.  Reports she came to the emergency department because she was unable to come to her pain management doctor Dr. Vlad Martin.  Denies fever/chills, recent illness, chest pain, shortness of breath, abdominal pain, dysuria/urgency/frequency/hematuria, inability to urinate, saddle anesthesia, urinary incontinence, gait disturbance, numbness/tingling.

## 2023-10-18 NOTE — ED PROVIDER NOTE - NSFOLLOWUPINSTRUCTIONS_ED_ALL_ED_FT
Follow-up with your PCP in 1-3 days.    Our Emergency Department Referral Coordinators will be reaching out to you in the next 24-48 hours from 9:00am to 5:00pm with a follow up appointment. Please expect a phone call from the hospital in that time frame. If you do not receive a call or if you have any questions or concerns, you can reach them at   [phone].    Back Pain    Back pain is very common in adults. The cause of back pain is rarely dangerous and the pain often gets better over time. The cause of your back pain may not be known and may include strain of muscles or ligaments, degeneration of the spinal disks, or arthritis. Occasionally the pain may radiate down your leg(s). Over-the-counter medicines to reduce pain and inflammation are often the most helpful. Stretching and remaining active frequently helps the healing process.     SEEK IMMEDIATE MEDICAL CARE IF YOU HAVE ANY OF THE FOLLOWING SYMPTOMS: bowel or bladder control problems, unusual weakness or numbness in your arms or legs, nausea or vomiting, abdominal pain, fever, dizziness/lightheadedness.

## 2023-10-18 NOTE — ED PROVIDER NOTE - PHYSICAL EXAMINATION
Physical Exam    Vital Signs: I have reviewed the initial vital signs.  Constitutional: appears stated age, no acute distress  Eyes: Sclera clear, EOMI.  Cardiovascular: S1 and S2, regular rate, regular rhythm, well-perfused extremities, radial pulses equal and 2+, pedal pulses 2+ and equal  Respiratory: unlabored respiratory effort, clear to auscultation bilaterally  Musculoskeletal: supple neck, no lower extremity edema, no midline tenderness, tenderness to palpation to left lumbar paraspinals  Integumentary: warm, dry, no rash  Neurologic: awake, alert, oriented x3, extremities’ motor and sensory functions grossly intact

## 2023-10-18 NOTE — ED PROVIDER NOTE - NS ED ATTENDING STATEMENT MOD
This was a shared visit with the [facility]. I reviewed and verified the documentation and independently performed the documented:

## 2025-02-18 NOTE — ED ADULT NURSE NOTE - BRAND OF COVID-19 VACCINATION
